# Patient Record
Sex: MALE | Race: ASIAN | NOT HISPANIC OR LATINO | URBAN - METROPOLITAN AREA
[De-identification: names, ages, dates, MRNs, and addresses within clinical notes are randomized per-mention and may not be internally consistent; named-entity substitution may affect disease eponyms.]

---

## 2022-07-24 ENCOUNTER — EMERGENCY (EMERGENCY)
Facility: HOSPITAL | Age: 62
LOS: 0 days | Discharge: HOME | End: 2022-07-24
Attending: EMERGENCY MEDICINE | Admitting: EMERGENCY MEDICINE

## 2022-07-24 VITALS
WEIGHT: 160.06 LBS | OXYGEN SATURATION: 99 % | HEART RATE: 88 BPM | RESPIRATION RATE: 18 BRPM | DIASTOLIC BLOOD PRESSURE: 99 MMHG | SYSTOLIC BLOOD PRESSURE: 173 MMHG | TEMPERATURE: 98 F

## 2022-07-24 DIAGNOSIS — E78.5 HYPERLIPIDEMIA, UNSPECIFIED: ICD-10-CM

## 2022-07-24 DIAGNOSIS — Z79.82 LONG TERM (CURRENT) USE OF ASPIRIN: ICD-10-CM

## 2022-07-24 DIAGNOSIS — Z79.02 LONG TERM (CURRENT) USE OF ANTITHROMBOTICS/ANTIPLATELETS: ICD-10-CM

## 2022-07-24 DIAGNOSIS — I25.10 ATHEROSCLEROTIC HEART DISEASE OF NATIVE CORONARY ARTERY WITHOUT ANGINA PECTORIS: ICD-10-CM

## 2022-07-24 DIAGNOSIS — I10 ESSENTIAL (PRIMARY) HYPERTENSION: ICD-10-CM

## 2022-07-24 DIAGNOSIS — K08.89 OTHER SPECIFIED DISORDERS OF TEETH AND SUPPORTING STRUCTURES: ICD-10-CM

## 2022-07-24 DIAGNOSIS — K06.8 OTHER SPECIFIED DISORDERS OF GINGIVA AND EDENTULOUS ALVEOLAR RIDGE: ICD-10-CM

## 2022-07-24 DIAGNOSIS — E11.9 TYPE 2 DIABETES MELLITUS WITHOUT COMPLICATIONS: ICD-10-CM

## 2022-07-24 LAB
ALBUMIN SERPL ELPH-MCNC: 4.4 G/DL — SIGNIFICANT CHANGE UP (ref 3.5–5.2)
ALP SERPL-CCNC: 72 U/L — SIGNIFICANT CHANGE UP (ref 30–115)
ALT FLD-CCNC: 26 U/L — SIGNIFICANT CHANGE UP (ref 0–41)
ANION GAP SERPL CALC-SCNC: 13 MMOL/L — SIGNIFICANT CHANGE UP (ref 7–14)
APTT BLD: 39.8 SEC — HIGH (ref 27–39.2)
AST SERPL-CCNC: 22 U/L — SIGNIFICANT CHANGE UP (ref 0–41)
BASOPHILS # BLD AUTO: 0.05 K/UL — SIGNIFICANT CHANGE UP (ref 0–0.2)
BASOPHILS NFR BLD AUTO: 0.7 % — SIGNIFICANT CHANGE UP (ref 0–1)
BILIRUB DIRECT SERPL-MCNC: <0.2 MG/DL — SIGNIFICANT CHANGE UP (ref 0–0.3)
BILIRUB INDIRECT FLD-MCNC: >0.5 MG/DL — SIGNIFICANT CHANGE UP (ref 0.2–1.2)
BILIRUB SERPL-MCNC: 0.7 MG/DL — SIGNIFICANT CHANGE UP (ref 0.2–1.2)
BUN SERPL-MCNC: 29 MG/DL — HIGH (ref 10–20)
CALCIUM SERPL-MCNC: 9.1 MG/DL — SIGNIFICANT CHANGE UP (ref 8.5–10.1)
CHLORIDE SERPL-SCNC: 104 MMOL/L — SIGNIFICANT CHANGE UP (ref 98–110)
CO2 SERPL-SCNC: 23 MMOL/L — SIGNIFICANT CHANGE UP (ref 17–32)
CREAT SERPL-MCNC: 1 MG/DL — SIGNIFICANT CHANGE UP (ref 0.7–1.5)
EGFR: 85 ML/MIN/1.73M2 — SIGNIFICANT CHANGE UP
EOSINOPHIL # BLD AUTO: 0.19 K/UL — SIGNIFICANT CHANGE UP (ref 0–0.7)
EOSINOPHIL NFR BLD AUTO: 2.6 % — SIGNIFICANT CHANGE UP (ref 0–8)
GLUCOSE SERPL-MCNC: 130 MG/DL — HIGH (ref 70–99)
HCT VFR BLD CALC: 48.2 % — SIGNIFICANT CHANGE UP (ref 42–52)
HGB BLD-MCNC: 16.8 G/DL — SIGNIFICANT CHANGE UP (ref 14–18)
IMM GRANULOCYTES NFR BLD AUTO: 0.3 % — SIGNIFICANT CHANGE UP (ref 0.1–0.3)
INR BLD: 0.94 RATIO — SIGNIFICANT CHANGE UP (ref 0.65–1.3)
LYMPHOCYTES # BLD AUTO: 1.3 K/UL — SIGNIFICANT CHANGE UP (ref 1.2–3.4)
LYMPHOCYTES # BLD AUTO: 18.1 % — LOW (ref 20.5–51.1)
MCHC RBC-ENTMCNC: 31.1 PG — HIGH (ref 27–31)
MCHC RBC-ENTMCNC: 34.9 G/DL — SIGNIFICANT CHANGE UP (ref 32–37)
MCV RBC AUTO: 89.3 FL — SIGNIFICANT CHANGE UP (ref 80–94)
MONOCYTES # BLD AUTO: 0.6 K/UL — SIGNIFICANT CHANGE UP (ref 0.1–0.6)
MONOCYTES NFR BLD AUTO: 8.4 % — SIGNIFICANT CHANGE UP (ref 1.7–9.3)
NEUTROPHILS # BLD AUTO: 5.01 K/UL — SIGNIFICANT CHANGE UP (ref 1.4–6.5)
NEUTROPHILS NFR BLD AUTO: 69.9 % — SIGNIFICANT CHANGE UP (ref 42.2–75.2)
NRBC # BLD: 0 /100 WBCS — SIGNIFICANT CHANGE UP (ref 0–0)
PLATELET # BLD AUTO: 205 K/UL — SIGNIFICANT CHANGE UP (ref 130–400)
POTASSIUM SERPL-MCNC: 4.1 MMOL/L — SIGNIFICANT CHANGE UP (ref 3.5–5)
POTASSIUM SERPL-SCNC: 4.1 MMOL/L — SIGNIFICANT CHANGE UP (ref 3.5–5)
PROT SERPL-MCNC: 7.1 G/DL — SIGNIFICANT CHANGE UP (ref 6–8)
PROTHROM AB SERPL-ACNC: 10.8 SEC — SIGNIFICANT CHANGE UP (ref 9.95–12.87)
RBC # BLD: 5.4 M/UL — SIGNIFICANT CHANGE UP (ref 4.7–6.1)
RBC # FLD: 12.3 % — SIGNIFICANT CHANGE UP (ref 11.5–14.5)
SODIUM SERPL-SCNC: 140 MMOL/L — SIGNIFICANT CHANGE UP (ref 135–146)
WBC # BLD: 7.17 K/UL — SIGNIFICANT CHANGE UP (ref 4.8–10.8)
WBC # FLD AUTO: 7.17 K/UL — SIGNIFICANT CHANGE UP (ref 4.8–10.8)

## 2022-07-24 PROCEDURE — 99284 EMERGENCY DEPT VISIT MOD MDM: CPT

## 2022-07-24 RX ORDER — TRANEXAMIC ACID 100 MG/ML
5 INJECTION, SOLUTION INTRAVENOUS ONCE
Refills: 0 | Status: COMPLETED | OUTPATIENT
Start: 2022-07-24 | End: 2022-07-24

## 2022-07-24 RX ORDER — AMOXICILLIN 250 MG/5ML
1 SUSPENSION, RECONSTITUTED, ORAL (ML) ORAL
Qty: 28 | Refills: 0
Start: 2022-07-24 | End: 2022-08-06

## 2022-07-24 RX ADMIN — TRANEXAMIC ACID 5 MILLILITER(S): 100 INJECTION, SOLUTION INTRAVENOUS at 03:48

## 2022-07-24 NOTE — ED PROVIDER NOTE - NSFOLLOWUPINSTRUCTIONS_ED_ALL_ED_FT
Periodontal Disease       Periodontal disease, also called gum disease or periodontitis, is inflammation or infection that affects the tissue that surrounds and supports the teeth (periodontal tissue). Periodontal tissue includes the gums (gingivae), the tissues that hold the teeth in place (periodontal ligaments), and the bone surrounding the tooth (alveolar bone). Periodontal disease can affect the tissue around one tooth or many teeth. If this condition is not treated, it can cause you to lose your teeth.      What are the causes?    This condition is usually caused by plaque. Plaque contains harmful bacteria that can produce acids and other waste products that cause gums to become swollen and infected. If periodontal disease gets worse, it can also damage other supporting tissues.    Plaque can develop due to:  •Poor oral care, such as not brushing teeth enough or not visiting the dentist regularly.       •Eating and drinking too many sugary foods and beverages.      If the plaque remains on the teeth for a long time, it hardens to form tartar, which can only be removed by a dentist. As the disease progresses, space forms between the teeth and surrounding tissues (pocketing), which can lead to tooth loss.      What increases the risk?    This condition is more likely to develop in people who:  •Smoke.       •Use tobacco.      •Clench or grind their teeth.      •Use drugs and other substances.      •Are having changes in hormones that are caused by puberty, menopause, or pregnancy.      •Are under stress.      •Are taking certain medicines, such as steroids, antiseizure medicines, or medicines to treat cancer.      •Have diabetes.      •Have poor nutrition.      •Have a disease that interferes with the body's disease-fighting system (immune system).      •Have a family history of periodontal disease.        What are the signs or symptoms?    Symptoms of this condition include:  •Red or swollen gums.      •Bad breath that does not go away.      •Gums that have pulled away (receded) from the teeth.      •Gums that bleed easily.      •Teeth that are loose or .      •Pain when chewing.      •Changes in the way your teeth fit together.      •Sensitive teeth.        How is this diagnosed?    This condition is diagnosed with an exam of the tissue around your teeth. Your health care provider may also take an X-ray of your teeth and the tissue around them. He or she will also ask about your medical history.      How is this treated?    Treatment for this condition depends on the extent of the disease, which is determined by a dental exam. Treatment may include:  •Brushing and flossing regularly.      •A deep dental cleaning that requires scraping the buildup of plaque and tartar from below the gum line (scaling and root planing). This may be needed if the disease progresses.      •Antibiotic medicines. These may be taken by mouth, as a rinse, or placed directly into affected areas.    •Surgery, in some severe cases. This may include:  •Surgery flap. Gums are lifted up to remove tartar deposits or to reduce a pocket of periodontal disease.      •Bone and tissue grafting. Damaged areas are replaced in order to help healthy bone and tissue grow.          Follow these instructions at home:      Oral hygiene                   Practice good oral hygiene. To do this:  •Brush your teeth twice a day with a soft toothbrush.      •Floss between your teeth every day.      •Get regular dental exams.      Medicines     •Take over-the-counter and prescription medicines only as told by your health care provider.      •If you were prescribed an antibiotic medicine or a rinse, take or use it as told by your health care provider. Do not stop taking or using the antibiotic even if your condition improves.      General instructions     • Do not use any products that contain nicotine or tobacco, such as cigarettes, e-cigarettes, and chewing tobacco. These can delay healing. If you need help quitting, ask your health care provider.    •Eat a healthy diet that includes plenty of vegetables, fruits, whole grains, low-fat dairy products, and lean protein.  •Do not eat a lot of foods that are high in solid fats, added sugars, or salt.      •Avoid beverages that contain a lot of sugar.          Where to find more information    •American Dental Association: www.mouthhealthy.org        Contact a health care provider if:    •Your symptoms do not improve.        Get help right away if:    •You have swelling in your face, neck, or jaw.      •You have severe pain that does not get better with medicine.      •You have a fever.        Summary    •Periodontal disease, also called gum disease or periodontitis, is inflammation or infection that affects the tissue that surrounds and supports the teeth (periodontal tissue).      •You are more likely to get this disease if you smoke, use tobacco, have a family history of the disease, or have certain conditions, such as diabetes.      •Practice good oral hygiene. Brush your teeth twice a day with a soft toothbrush. Floss between your teeth every day.      • Do not use any products that contain nicotine or tobacco, such as cigarettes, e-cigarettes, and chewing tobacco. If you need help quitting, ask your health care provider.      This information is not intended to replace advice given to you by your health care provider. Make sure you discuss any questions you have with your health care provider.

## 2022-07-24 NOTE — ED PROVIDER NOTE - OBJECTIVE STATEMENT
61 y m, pmh of dm, htn, hld, cad, pw dental bleeding, started tonight after dinner, left lower tooth, no dentalgia, bleeding not stopped with cotton ball, no syncopal episode, dysphagia, sob

## 2022-07-24 NOTE — ED PROVIDER NOTE - PATIENT PORTAL LINK FT
You can access the FollowMyHealth Patient Portal offered by [facility] by registering at the following website: [email or web address]. By joining [facility]’s FollowMyHealth portal, you will also be able to view your health information using other applications (apps) compatible with our system.

## 2022-07-24 NOTE — CONSULT NOTE ADULT - ASSESSMENT
Patient is a 61y old  Male who presents with a chief complaint of lower left oral bleeding    HPI: 61 y m, pmh of dm, htn, hld, cad, pw dental bleeding, started tonight after dinner, left lower tooth, no dentalgia, bleeding not stopped with cotton ball, no syncopal episode, dysphagia, sob. No swelling present. Patient stated no trauma and no dental work done recently. Patient did not take aspirin for 2 days but took all other meds.      PAST MEDICAL & SURGICAL HISTORY:  Patient stated he had stent placed in his heart about a year ago.    (  - ) heart valve replacement  ( -  ) joint replacement  ( -  ) pregnancy    MEDICATIONS  (STANDING):    Atorvastatin  Metoprolol  Plavix  Aspirin  Vitamin D3    MEDICATIONS  (PRN):      Allergies    No Known Allergies    Intolerances        FAMILY HISTORY:      *SOCIAL HISTORY: ( -  ) Tobacco; (  + ) ETOH    *Last Dental Visit:    Vital Signs Last 24 Hrs  T(C): 36.4 (24 Jul 2022 01:14), Max: 36.4 (24 Jul 2022 01:14)  T(F): 97.6 (24 Jul 2022 01:14), Max: 97.6 (24 Jul 2022 01:14)  HR: 88 (24 Jul 2022 01:14) (88 - 88)  BP: 173/99 (24 Jul 2022 01:14) (173/99 - 173/99)  BP(mean): --  RR: 18 (24 Jul 2022 01:14) (18 - 18)  SpO2: 99% (24 Jul 2022 01:14) (99% - 99%)    Parameters below as of 24 Jul 2022 01:14  Patient On (Oxygen Delivery Method): room air        LABS:                        16.8   7.17  )-----------( 205      ( 24 Jul 2022 02:40 )             48.2     07-24    140  |  104  |  29<H>  ----------------------------<  130<H>  4.1   |  23  |  1.0    Ca    9.1      24 Jul 2022 02:40      WBC Count: 7.17 K/uL [4.80 - 10.80] (07-24 @ 02:40)  Platelet Count - Automated: 205 K/uL [130 - 400] (07-24 @ 02:40)  INR: 0.94 ratio [0.65 - 1.30] (07-24 @ 02:40)        PT/INR - ( 24 Jul 2022 02:40 )   PT: 10.80 sec;   INR: 0.94 ratio         PTT - ( 24 Jul 2022 02:40 )  PTT:39.8 sec  EOE:  TMJ ( -  ) clicks                     ( -  ) pops                     ( -  ) crepitus             Mandible <<FROM>>             Facial bones and MOM <<grossly intact>>             (-   ) trismus             ( -  ) lymphadenopathy             ( -  ) swelling             ( -  ) asymmetry             (  - ) palpation             ( -  ) dyspnea             (-   ) dysphagia             (   ) loss of consciousness    IOE:  <<permanent>> dentition: <<grossly intact>>           hard/soft palate:  ( -  ) palatal torus, <<No pathology noted>>           tongue/FOM <<No pathology noted>>           labial/buccal mucosa <<No pathology noted>>           ( -  ) percussion           (-   ) palpation           (  - ) swelling            ( -  ) abscess           (  - ) sinus tract    Dentition present: <<  yes >>  Mobility: << yes Class III #18  >>  Caries: << no   >>         *DENTAL RADIOGRAPHS: None taken    RADIOLOGY & ADDITIONAL STUDIES: N/A    *ASSESSMENT: Upon clinical exam, #18 is class III mobile. Calculus throughout patients mouth. Source of bleeding is coming from #18 and 19.       *PLAN: Local Lidocaine injection and follow up with pressure on the lower left area using 4x4 guaze. If bleeding continues, will use tranexamic acid topically placed on 4x4 guaze.   PROCEDURE:   Verbal and written consent given.  Anesthesia: << 1 Carpule of 2% lidocaine with 1:100,000 Epinephrine via local infiltration    >>   Treatment: << Emergency exam done. Lower left bleeding. Upon clinical exam, #18 is class III mobile. Calculus throughout patients mouth. Source of bleeding is coming from #18 and 19. 1 Carpule of 2% lidocaine with 1:100,000 Epinephrine via local infiltration. Applied pressure with 4x4 guaze for 20 minutes. Bleeding continued so used tranexamic acid topically placed on 4x4 guaze. Placed lower left and applied pressure. Bleeding stopped. Patient was advised not to spit nor eat hot or spicy food for a couple of days. Patient will be prescribed amoxicillin 500 mg for infection control Patient was told verbally to follow up with cardiologist and private dentist.      >>     RECOMMENDATIONS:  1) << Take amoxicillin 500 mg for infection control. Follow up with cardiologist and private dentist.   >>  2) Dental F/U with outpatient dentist for comprehensive dental care.   3) If any difficulty swallowing/breathing, fever occur, return to ER.     Resident Name, pager #  Chris Greenwood DDS

## 2022-07-24 NOTE — ED ADULT NURSE NOTE - SUICIDE SCREENING QUESTION 2
From: Catherine Nieto  To: Farhana Bridges  Sent: 8/22/2021 8:13 PM CDT  Subject: Medication Question    This message is being sent by Nisreen Hale on behalf of Catherine Nieto.    Catherine needs a refill of his Adderall   Please send to [facility] in [place]   Thanks Nisreen   No

## 2022-07-24 NOTE — ED PROVIDER NOTE - NSFOLLOWUPCLINICS_GEN_ALL_ED_FT
[facility]  Dental  [address] [place] [id number]  Phone: [phone]  Fax:   Follow Up Time: 1-3 Days

## 2022-07-24 NOTE — ED PROVIDER NOTE - PHYSICAL EXAMINATION
CONSTITUTIONAL: NAD  SKIN: Warm dry  HEAD: NCAT  EYES: NL inspection  ENT: +bleeding from left lower gum, mild oozing appreciated, soaking through cotton ball  NECK: Supple; non tender.  CARD: RRR  RESP: CTAB  ABD: S/NT no R/G  EXT: no pedal edema  NEURO: Grossly unremarkable  PSYCH: Cooperative, appropriate.

## 2022-07-24 NOTE — ED PROVIDER NOTE - ATTENDING CONTRIBUTION TO CARE
Patient presents to ED for evaluation of Gum bleeding. Denies trauma, denies f/c/n/v/cp/sob/dizziness/HA.   Vitals reviewed.   Patient is awake, alert, answering questions appropriately, appears comfortable and not in any distress.  +gum bleeding from the molar tooth with dental caries noted, no abscess. No pulsatile bleeding noted.   supple neck.   CNS: awake, alert, o x 3, no focal neurologic deficits.  A/P: Gum bleeding.   labs, Dental consult,   reevaluation.